# Patient Record
Sex: FEMALE | Race: WHITE | NOT HISPANIC OR LATINO
[De-identification: names, ages, dates, MRNs, and addresses within clinical notes are randomized per-mention and may not be internally consistent; named-entity substitution may affect disease eponyms.]

---

## 2017-02-28 ENCOUNTER — APPOINTMENT (OUTPATIENT)
Dept: SURGICAL ONCOLOGY | Facility: CLINIC | Age: 77
End: 2017-02-28

## 2017-02-28 VITALS
TEMPERATURE: 97.4 F | HEIGHT: 67 IN | OXYGEN SATURATION: 98 % | WEIGHT: 150 LBS | HEART RATE: 66 BPM | SYSTOLIC BLOOD PRESSURE: 122 MMHG | BODY MASS INDEX: 23.54 KG/M2 | DIASTOLIC BLOOD PRESSURE: 72 MMHG | RESPIRATION RATE: 14 BRPM

## 2017-02-28 DIAGNOSIS — Z87.39 PERSONAL HISTORY OF OTHER DISEASES OF THE MUSCULOSKELETAL SYSTEM AND CONNECTIVE TISSUE: ICD-10-CM

## 2017-02-28 DIAGNOSIS — Z78.9 OTHER SPECIFIED HEALTH STATUS: ICD-10-CM

## 2017-02-28 DIAGNOSIS — D11.9 BENIGN NEOPLASM OF MAJOR SALIVARY GLAND, UNSPECIFIED: ICD-10-CM

## 2017-02-28 DIAGNOSIS — M19.90 UNSPECIFIED OSTEOARTHRITIS, UNSPECIFIED SITE: ICD-10-CM

## 2017-02-28 DIAGNOSIS — D24.9 BENIGN NEOPLASM OF UNSPECIFIED BREAST: ICD-10-CM

## 2017-02-28 DIAGNOSIS — Z86.79 PERSONAL HISTORY OF OTHER DISEASES OF THE CIRCULATORY SYSTEM: ICD-10-CM

## 2017-02-28 RX ORDER — ASPIRIN ENTERIC COATED TABLETS 81 MG 81 MG/1
81 TABLET, DELAYED RELEASE ORAL DAILY
Refills: 0 | Status: ACTIVE | COMMUNITY

## 2017-02-28 RX ORDER — NEBIVOLOL HYDROCHLORIDE 5 MG/1
5 TABLET ORAL DAILY
Refills: 0 | Status: ACTIVE | COMMUNITY

## 2017-07-10 ENCOUNTER — FORM ENCOUNTER (OUTPATIENT)
Age: 77
End: 2017-07-10

## 2017-07-11 ENCOUNTER — OUTPATIENT (OUTPATIENT)
Dept: OUTPATIENT SERVICES | Facility: HOSPITAL | Age: 77
LOS: 1 days | End: 2017-07-11
Payer: MEDICARE

## 2017-07-11 ENCOUNTER — APPOINTMENT (OUTPATIENT)
Dept: MAMMOGRAPHY | Facility: IMAGING CENTER | Age: 77
End: 2017-07-11

## 2017-07-11 DIAGNOSIS — Z12.31 ENCOUNTER FOR SCREENING MAMMOGRAM FOR MALIGNANT NEOPLASM OF BREAST: ICD-10-CM

## 2017-07-11 PROCEDURE — 77063 BREAST TOMOSYNTHESIS BI: CPT

## 2017-07-11 PROCEDURE — 77067 SCR MAMMO BI INCL CAD: CPT

## 2017-07-25 ENCOUNTER — APPOINTMENT (OUTPATIENT)
Dept: SURGICAL ONCOLOGY | Facility: CLINIC | Age: 77
End: 2017-07-25

## 2017-07-25 VITALS
SYSTOLIC BLOOD PRESSURE: 138 MMHG | DIASTOLIC BLOOD PRESSURE: 81 MMHG | OXYGEN SATURATION: 98 % | WEIGHT: 149 LBS | HEIGHT: 67 IN | HEART RATE: 99 BPM | BODY MASS INDEX: 23.39 KG/M2

## 2018-07-16 ENCOUNTER — FORM ENCOUNTER (OUTPATIENT)
Age: 78
End: 2018-07-16

## 2018-07-17 ENCOUNTER — APPOINTMENT (OUTPATIENT)
Dept: MAMMOGRAPHY | Facility: IMAGING CENTER | Age: 78
End: 2018-07-17
Payer: MEDICARE

## 2018-07-17 ENCOUNTER — OUTPATIENT (OUTPATIENT)
Dept: OUTPATIENT SERVICES | Facility: HOSPITAL | Age: 78
LOS: 1 days | End: 2018-07-17
Payer: MEDICARE

## 2018-07-17 DIAGNOSIS — Z00.8 ENCOUNTER FOR OTHER GENERAL EXAMINATION: ICD-10-CM

## 2018-07-17 PROCEDURE — 77063 BREAST TOMOSYNTHESIS BI: CPT

## 2018-07-17 PROCEDURE — 77067 SCR MAMMO BI INCL CAD: CPT | Mod: 26

## 2018-07-17 PROCEDURE — 77067 SCR MAMMO BI INCL CAD: CPT

## 2018-07-17 PROCEDURE — 77063 BREAST TOMOSYNTHESIS BI: CPT | Mod: 26

## 2018-07-24 ENCOUNTER — APPOINTMENT (OUTPATIENT)
Dept: SURGICAL ONCOLOGY | Facility: CLINIC | Age: 78
End: 2018-07-24
Payer: MEDICARE

## 2018-07-24 VITALS
BODY MASS INDEX: 23.54 KG/M2 | SYSTOLIC BLOOD PRESSURE: 121 MMHG | DIASTOLIC BLOOD PRESSURE: 68 MMHG | WEIGHT: 150 LBS | HEIGHT: 67 IN | RESPIRATION RATE: 15 BRPM | HEART RATE: 90 BPM

## 2018-07-24 PROCEDURE — 99213 OFFICE O/P EST LOW 20 MIN: CPT

## 2019-08-19 ENCOUNTER — FORM ENCOUNTER (OUTPATIENT)
Age: 79
End: 2019-08-19

## 2019-08-20 ENCOUNTER — OUTPATIENT (OUTPATIENT)
Dept: OUTPATIENT SERVICES | Facility: HOSPITAL | Age: 79
LOS: 1 days | End: 2019-08-20
Payer: MEDICARE

## 2019-08-20 ENCOUNTER — APPOINTMENT (OUTPATIENT)
Dept: MAMMOGRAPHY | Facility: IMAGING CENTER | Age: 79
End: 2019-08-20
Payer: MEDICARE

## 2019-08-20 DIAGNOSIS — Z00.8 ENCOUNTER FOR OTHER GENERAL EXAMINATION: ICD-10-CM

## 2019-08-20 PROCEDURE — 77067 SCR MAMMO BI INCL CAD: CPT | Mod: 26

## 2019-08-20 PROCEDURE — 77063 BREAST TOMOSYNTHESIS BI: CPT | Mod: 26

## 2019-08-20 PROCEDURE — 77063 BREAST TOMOSYNTHESIS BI: CPT

## 2019-08-20 PROCEDURE — 77067 SCR MAMMO BI INCL CAD: CPT

## 2019-09-03 ENCOUNTER — APPOINTMENT (OUTPATIENT)
Dept: SURGICAL ONCOLOGY | Facility: CLINIC | Age: 79
End: 2019-09-03
Payer: MEDICARE

## 2019-09-03 VITALS
RESPIRATION RATE: 16 BRPM | SYSTOLIC BLOOD PRESSURE: 145 MMHG | BODY MASS INDEX: 21.82 KG/M2 | OXYGEN SATURATION: 97 % | DIASTOLIC BLOOD PRESSURE: 75 MMHG | HEART RATE: 67 BPM | HEIGHT: 67 IN | WEIGHT: 139 LBS | TEMPERATURE: 97.7 F

## 2019-09-03 DIAGNOSIS — N60.19 DIFFUSE CYSTIC MASTOPATHY OF UNSPECIFIED BREAST: ICD-10-CM

## 2019-09-03 PROCEDURE — 99213 OFFICE O/P EST LOW 20 MIN: CPT

## 2019-09-03 NOTE — ADDENDUM
[FreeTextEntry1] : I, Sasha Thrasher, acted soley as a scribe for Dr. Óscar Schreiber on 9/3/19\par

## 2019-09-03 NOTE — PHYSICAL EXAM
[Normal] : supple, no neck mass and thyroid not enlarged [Normal Neck Lymph Nodes] : normal neck lymph nodes  [Normal Groin Lymph Nodes] : normal groin lymph nodes [Normal] : oriented to person, place and time, with appropriate affect [FreeTextEntry1] : I, Sasha Thrasher, was present for the physical exam.\par  [de-identified] : Normal S1,S2. Regular rate and rhythm [de-identified] : Complete normal breast examination performed supine and upright revealed no palpable masses, nipple discharge, inversion, deviation, or enlarge axillary lymph nodes, or supraclavicular lymph nodes. [de-identified] : Clear breath sounds bilaterally, normal respiratory effort\par \par

## 2019-09-03 NOTE — ASSESSMENT
[FreeTextEntry1] : Imp:\par No evidence of new or recurrent lesions. Breast imaging failed to identify any suspicious lesions. No suspicious lesions on exam\par \par Plan:\par Continue yearly surveillance\par \par \par

## 2021-03-20 ENCOUNTER — EMERGENCY (EMERGENCY)
Facility: HOSPITAL | Age: 81
LOS: 1 days | Discharge: LEFT BEFORE DISCHARGE | End: 2021-03-20
Attending: STUDENT IN AN ORGANIZED HEALTH CARE EDUCATION/TRAINING PROGRAM
Payer: MEDICARE

## 2021-03-20 VITALS
OXYGEN SATURATION: 98 % | SYSTOLIC BLOOD PRESSURE: 138 MMHG | RESPIRATION RATE: 17 BRPM | HEART RATE: 86 BPM | DIASTOLIC BLOOD PRESSURE: 90 MMHG | TEMPERATURE: 98 F

## 2021-03-20 VITALS
SYSTOLIC BLOOD PRESSURE: 136 MMHG | OXYGEN SATURATION: 95 % | HEIGHT: 67 IN | TEMPERATURE: 99 F | HEART RATE: 97 BPM | WEIGHT: 130.07 LBS | DIASTOLIC BLOOD PRESSURE: 77 MMHG | RESPIRATION RATE: 18 BRPM

## 2021-03-20 LAB
ALBUMIN SERPL ELPH-MCNC: 4.5 G/DL — SIGNIFICANT CHANGE UP (ref 3.3–5)
ALP SERPL-CCNC: 74 U/L — SIGNIFICANT CHANGE UP (ref 40–120)
ALT FLD-CCNC: 16 U/L — SIGNIFICANT CHANGE UP (ref 10–45)
ANION GAP SERPL CALC-SCNC: 14 MMOL/L — SIGNIFICANT CHANGE UP (ref 5–17)
APTT BLD: 31.6 SEC — SIGNIFICANT CHANGE UP (ref 27.5–35.5)
AST SERPL-CCNC: 20 U/L — SIGNIFICANT CHANGE UP (ref 10–40)
BASOPHILS # BLD AUTO: 0.28 K/UL — HIGH (ref 0–0.2)
BASOPHILS NFR BLD AUTO: 3.5 % — HIGH (ref 0–2)
BILIRUB SERPL-MCNC: 0.7 MG/DL — SIGNIFICANT CHANGE UP (ref 0.2–1.2)
BUN SERPL-MCNC: 16 MG/DL — SIGNIFICANT CHANGE UP (ref 7–23)
CALCIUM SERPL-MCNC: 9.4 MG/DL — SIGNIFICANT CHANGE UP (ref 8.4–10.5)
CHLORIDE SERPL-SCNC: 101 MMOL/L — SIGNIFICANT CHANGE UP (ref 96–108)
CO2 SERPL-SCNC: 25 MMOL/L — SIGNIFICANT CHANGE UP (ref 22–31)
CREAT SERPL-MCNC: 0.67 MG/DL — SIGNIFICANT CHANGE UP (ref 0.5–1.3)
EOSINOPHIL # BLD AUTO: 0.07 K/UL — SIGNIFICANT CHANGE UP (ref 0–0.5)
EOSINOPHIL NFR BLD AUTO: 0.9 % — SIGNIFICANT CHANGE UP (ref 0–6)
GLUCOSE SERPL-MCNC: 80 MG/DL — SIGNIFICANT CHANGE UP (ref 70–99)
HCT VFR BLD CALC: 42.8 % — SIGNIFICANT CHANGE UP (ref 34.5–45)
HGB BLD-MCNC: 13.6 G/DL — SIGNIFICANT CHANGE UP (ref 11.5–15.5)
INR BLD: 0.98 RATIO — SIGNIFICANT CHANGE UP (ref 0.88–1.16)
LIDOCAIN IGE QN: 23 U/L — SIGNIFICANT CHANGE UP (ref 7–60)
LYMPHOCYTES # BLD AUTO: 1.92 K/UL — SIGNIFICANT CHANGE UP (ref 1–3.3)
LYMPHOCYTES # BLD AUTO: 23.7 % — SIGNIFICANT CHANGE UP (ref 13–44)
MANUAL SMEAR VERIFICATION: SIGNIFICANT CHANGE UP
MCHC RBC-ENTMCNC: 28.5 PG — SIGNIFICANT CHANGE UP (ref 27–34)
MCHC RBC-ENTMCNC: 31.8 GM/DL — LOW (ref 32–36)
MCV RBC AUTO: 89.5 FL — SIGNIFICANT CHANGE UP (ref 80–100)
MONOCYTES # BLD AUTO: 0.21 K/UL — SIGNIFICANT CHANGE UP (ref 0–0.9)
MONOCYTES NFR BLD AUTO: 2.6 % — SIGNIFICANT CHANGE UP (ref 2–14)
NEUTROPHILS # BLD AUTO: 5.34 K/UL — SIGNIFICANT CHANGE UP (ref 1.8–7.4)
NEUTROPHILS NFR BLD AUTO: 65.8 % — SIGNIFICANT CHANGE UP (ref 43–77)
PLAT MORPH BLD: ABNORMAL
PLATELET # BLD AUTO: 200 K/UL — SIGNIFICANT CHANGE UP (ref 150–400)
POTASSIUM SERPL-MCNC: 3.3 MMOL/L — LOW (ref 3.5–5.3)
POTASSIUM SERPL-SCNC: 3.3 MMOL/L — LOW (ref 3.5–5.3)
PROT SERPL-MCNC: 7.3 G/DL — SIGNIFICANT CHANGE UP (ref 6–8.3)
PROTHROM AB SERPL-ACNC: 11.8 SEC — SIGNIFICANT CHANGE UP (ref 10.6–13.6)
RBC # BLD: 4.78 M/UL — SIGNIFICANT CHANGE UP (ref 3.8–5.2)
RBC # FLD: 13.3 % — SIGNIFICANT CHANGE UP (ref 10.3–14.5)
RBC BLD AUTO: SIGNIFICANT CHANGE UP
SODIUM SERPL-SCNC: 140 MMOL/L — SIGNIFICANT CHANGE UP (ref 135–145)
VARIANT LYMPHS # BLD: 3.5 % — SIGNIFICANT CHANGE UP (ref 0–6)
WBC # BLD: 8.11 K/UL — SIGNIFICANT CHANGE UP (ref 3.8–10.5)
WBC # FLD AUTO: 8.11 K/UL — SIGNIFICANT CHANGE UP (ref 3.8–10.5)

## 2021-03-20 PROCEDURE — 99285 EMERGENCY DEPT VISIT HI MDM: CPT

## 2021-03-20 PROCEDURE — 80053 COMPREHEN METABOLIC PANEL: CPT

## 2021-03-20 PROCEDURE — 83690 ASSAY OF LIPASE: CPT

## 2021-03-20 PROCEDURE — 36000 PLACE NEEDLE IN VEIN: CPT

## 2021-03-20 PROCEDURE — 99283 EMERGENCY DEPT VISIT LOW MDM: CPT | Mod: 25

## 2021-03-20 PROCEDURE — 85730 THROMBOPLASTIN TIME PARTIAL: CPT

## 2021-03-20 PROCEDURE — 85025 COMPLETE CBC W/AUTO DIFF WBC: CPT

## 2021-03-20 PROCEDURE — 85610 PROTHROMBIN TIME: CPT

## 2021-03-20 RX ORDER — POTASSIUM CHLORIDE 20 MEQ
40 PACKET (EA) ORAL ONCE
Refills: 0 | Status: COMPLETED | OUTPATIENT
Start: 2021-03-20 | End: 2021-03-20

## 2021-03-20 RX ADMIN — Medication 1 TABLET(S): at 19:49

## 2021-03-20 RX ADMIN — Medication 40 MILLIEQUIVALENT(S): at 19:50

## 2021-03-20 NOTE — ED PROVIDER NOTE - ATTENDING CONTRIBUTION TO CARE
80 F w/ hx of diverticulitis, presents to the ER w/ LLQ abdominal pain started yesterday, she reports no fevers, no chills, decreased POi intake, started a liquid diet for her symptoms. Pt reports improvement in pain. Today went to UC and was told to come to the ER for a ct scan. Pt denies any cp, no sob. 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

## 2021-03-20 NOTE — ED PROVIDER NOTE - PHYSICAL EXAMINATION
NAD, VSS, Afebrile, No chest wall tender or lesions. + LUQ/LLQ tender. No CVA tender. No peripheral edema. Neuro- intact with steady gait.

## 2021-03-20 NOTE — ED PROVIDER NOTE - OBJECTIVE STATEMENT
81yo female pt with PMHx of HTN presents to ED with Left sided abd pain. Pt stated she's had intermittent left upper abd pain since one week ago after heavy lifting and the pain's worse 3days ago after 81yo female pt with PMHx of HTN presents to ED with Left sided abd pain and sent by UC for further evaluation. 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 Reported the pain's worse 3days ago after she broke her fall when a shopping cart struck. Denies fall. She's not taken any pain med and went to UC today. Denies LOC or head injury. Denies headache, dizziness or neck pain. Denies back pain. Denies CP/SOB/ABD pain or N/V/D. Denies urinary problems. Denies fever, chills, cough or congestion. 81yo female pt with PMHx of HTN, H/O Diverticulitis presents to ED with Left sided abd pain and sent by UC for further evaluation. 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 Reported the pain's worse 3days ago after she broke her fall when a shopping cart struck. Denies fall. She's not taken any pain med and went to UC today. Denies LOC or head injury. Denies headache, dizziness or neck pain. Denies back pain. Denies CP/SOB/ABD pain or N/V/D. Denies urinary problems. Denies fever, chills, cough or congestion.

## 2021-03-20 NOTE — ED PROVIDER NOTE - PROGRESS NOTE DETAILS
Pt refused CT and wants to go home now. She wants to f/u with PMD for reevaluation. Informed AMA discharge and warned risks and dangers after d/c.

## 2021-03-20 NOTE — ED PROVIDER NOTE - NSFOLLOWUPINSTRUCTIONS_ED_ALL_ED_FT
You are signing on Against Medical Advice (AMA) discharge and understood the dangers and risks after AMA discharge and return to ED for any concerns or worsening symptoms.    Hydrate.    Keep continue your current medications.     Take Tylenol for pain as needed and Augmentin as prescribed.    Please see the information of abdominal pain.    Follow up with your primary Dr. Vaughan in [place] for reevaluation on Monday.

## 2021-03-20 NOTE — ED PROVIDER NOTE - PATIENT PORTAL LINK FT
You can access the FollowMyHealth Patient Portal offered by [facility] by registering at the following website: [email or web address]. By joining [facility]’s FollowMyHealth portal, you will also be able to view your health information using other applications (apps) compatible with our system.

## 2021-03-20 NOTE — ED ADULT NURSE REASSESSMENT NOTE - NS ED NURSE REASSESS COMMENT FT1
PT AAOx3. VSS. States "I am about to leave I can't wait for the cat scan". Pt verbalizes understanding for imaging and states "I will go to my doctor on Monday". MD Parisi made aware.

## 2021-03-20 NOTE — ED PROVIDER NOTE - CLINICAL SUMMARY MEDICAL DECISION MAKING FREE TEXT BOX
You are signing on Against Medical Advice (AMA) discharge and understood the dangers and risks after AMA discharge and return to ED for any concerns or worsening symptoms.    Hydrate.    Keep continue your current medications.     Take Tylenol for pain as needed and Augmentin as prescribed.    Please see the information of abdominal pain.    Follow up with your primary Dr. Vaughan in [place] for reevaluation on Monday. see attending attestation     pt doesn't want to wait for ct scan, requesting antibiotics, will dc home w/ rx for diverticulitis

## 2021-03-20 NOTE — ED ADULT NURSE NOTE - OBJECTIVE STATEMENT
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 Patient was told possible splenic injury. Patient denies SOB, chest pain, n/v/d/f. Patient has 18 gauge IV to LAC. Patient is ambulatory without assistance. Patient is given the call bell and verbalizes understanding of how to use it.

## 2021-09-28 ENCOUNTER — TRANSCRIPTION ENCOUNTER (OUTPATIENT)
Age: 81
End: 2021-09-28

## 2023-02-25 NOTE — ED PROVIDER NOTE - MUSCULOSKELETAL, MLM
[FreeTextEntry2] : rt knee twisted 1 month ago Spine appears normal, range of motion is not limited, no muscle or joint tenderness

## 2023-07-20 NOTE — ED ADULT TRIAGE NOTE - SOURCE OF INFORMATION
Patient Dapsone Pregnancy And Lactation Text: This medication is Pregnancy Category C and is not considered safe during pregnancy or breast feeding.
